# Patient Record
Sex: FEMALE
[De-identification: names, ages, dates, MRNs, and addresses within clinical notes are randomized per-mention and may not be internally consistent; named-entity substitution may affect disease eponyms.]

---

## 2021-09-17 ENCOUNTER — RX ONLY (RX ONLY)
Age: 51
End: 2021-09-17

## 2021-09-17 RX ORDER — FLUCONAZOLE 200 MG/1
TABLET ORAL
Qty: 7 | Refills: 2 | Status: ERX | COMMUNITY
Start: 2021-09-17

## 2021-09-30 ENCOUNTER — APPOINTMENT (OUTPATIENT)
Dept: URBAN - METROPOLITAN AREA CLINIC 271 | Age: 51
Setting detail: DERMATOLOGY
End: 2021-10-20

## 2021-09-30 DIAGNOSIS — Z41.9 ENCOUNTER FOR PROCEDURE FOR PURPOSES OTHER THAN REMEDYING HEALTH STATE, UNSPECIFIED: ICD-10-CM

## 2021-09-30 PROCEDURE — OTHER BOTOX: OTHER

## 2021-09-30 NOTE — PROCEDURE: BOTOX
Consent: Written consent obtained. Risks include but not limited to lid/brow ptosis, bruising, swelling, diplopia, temporary effect, incomplete chemical denervation. Patient verbalized understanding.\\nPatient and RN went over amount of units needed and ugalde, patient agreed with assessment and consented to proceed.

## 2021-11-18 ENCOUNTER — RX ONLY (RX ONLY)
Age: 51
End: 2021-11-18

## 2021-11-18 RX ORDER — NITROFURANTOIN MONOHYDRATE/MACROCRYSTALLINE 25; 75 MG/1; MG/1
CAPSULE ORAL
Qty: 10 | Refills: 1 | Status: ERX | COMMUNITY
Start: 2021-11-18

## 2021-11-18 RX ORDER — FLUCONAZOLE 200 MG/1
TABLET ORAL
Qty: 7 | Refills: 1 | Status: ERX | COMMUNITY
Start: 2021-11-18

## 2021-12-03 ENCOUNTER — RX ONLY (RX ONLY)
Age: 51
End: 2021-12-03

## 2021-12-03 RX ORDER — CEPHALEXIN 500 MG/1
CAPSULE ORAL
Qty: 21 | Refills: 3 | Status: ERX | COMMUNITY
Start: 2021-12-03

## 2021-12-16 ENCOUNTER — APPOINTMENT (OUTPATIENT)
Dept: URBAN - METROPOLITAN AREA CLINIC 271 | Age: 51
Setting detail: DERMATOLOGY
End: 2021-12-17

## 2021-12-16 DIAGNOSIS — Z41.9 ENCOUNTER FOR PROCEDURE FOR PURPOSES OTHER THAN REMEDYING HEALTH STATE, UNSPECIFIED: ICD-10-CM

## 2021-12-16 PROCEDURE — OTHER BOTOX: OTHER

## 2021-12-16 PROCEDURE — OTHER COSMETIC CONSULTATION: BOTOX: OTHER

## 2021-12-16 NOTE — HPI: COSMETIC (BOTOX)
Have You Had Botox Before?: has had botox
When Was Your Last Botox Treatment?: 2-3mo ago, requests light tough up today

## 2022-04-22 ENCOUNTER — RX ONLY (RX ONLY)
Age: 52
End: 2022-04-22

## 2022-04-22 RX ORDER — FLUCONAZOLE 200 MG/1
TABLET ORAL
Qty: 7 | Refills: 1 | Status: ERX

## 2022-04-22 RX ORDER — CEPHALEXIN 500 MG/1
CAPSULE ORAL
Qty: 21 | Refills: 3 | Status: ERX

## 2022-04-27 ENCOUNTER — RX ONLY (RX ONLY)
Age: 52
End: 2022-04-27

## 2022-04-27 RX ORDER — VALACYCLOVIR HYDROCHLORIDE 1 G/1
TABLET, FILM COATED ORAL
Qty: 21 | Refills: 1 | Status: ERX | COMMUNITY
Start: 2022-04-27

## 2022-06-28 ENCOUNTER — RX ONLY (RX ONLY)
Age: 52
End: 2022-06-28

## 2022-06-28 RX ORDER — AZITHROMYCIN DIHYDRATE 250 MG/1
TABLET, FILM COATED ORAL
Qty: 1 | Refills: 1 | Status: ERX | COMMUNITY
Start: 2022-06-28

## 2023-08-14 ENCOUNTER — RX ONLY (RX ONLY)
Age: 53
End: 2023-08-14

## 2023-08-14 RX ORDER — TIRBANIBULIN 10 MG/G
OINTMENT TOPICAL
Qty: 1 | Refills: 0 | Status: ERX | COMMUNITY
Start: 2023-08-14

## 2023-10-09 ENCOUNTER — APPOINTMENT (OUTPATIENT)
Dept: URBAN - METROPOLITAN AREA CLINIC 303 | Age: 53
Setting detail: DERMATOLOGY
End: 2023-10-09

## 2023-10-09 DIAGNOSIS — Z41.9 ENCOUNTER FOR PROCEDURE FOR PURPOSES OTHER THAN REMEDYING HEALTH STATE, UNSPECIFIED: ICD-10-CM

## 2023-10-09 PROCEDURE — OTHER BOTOX: OTHER

## 2023-10-09 NOTE — PROCEDURE: BOTOX
Consent: Written consent obtained. Risks include but not limited to lid/brow ptosis, bruising, swelling, diplopia, temporary effect, incomplete chemical denervation. \\n\\n* Patient denies pregnancy, breastfeeding, neuromuscular disease,  egg allergy, recent/upcoming eye surgery, recent/upcoming vaccine within 2 wks, upcoming dental appt within 2wks, or recent aminoglycoside antibiotic treatment.\\n\\n*Thoroughly cleansed sites with alcohol prior to injection.\\n\\n*

## 2023-10-09 NOTE — PROCEDURE: BOTOX
POST-CARE INSTRUCTIONS\\nLimit physical activity for 24 hrs.\\nAvoid pressure on the treated sites today - no tight hats, scratching, or massaging.\\n'Exercise' the treated muscles for the next few hours by making purposeful facial expressions every 20min.\\nRemain upright for 4 hours - no reclining, yoga, etc.\\nAvoid makeup application for at least 2 hours.\\nEffects are not immediate. Effects usually last 3-4 months but depends on metabolism, gender, muscle mass, amount of units injected, etc.\\nFollow-up or touch-up can be considered in 2 weeks if patient has any concerns (ie unevenness, continued localized muscle movement), otherwise recommended follow up in 3-4mo.

## 2023-10-23 ENCOUNTER — APPOINTMENT (OUTPATIENT)
Dept: URBAN - METROPOLITAN AREA CLINIC 303 | Age: 53
Setting detail: DERMATOLOGY
End: 2023-10-23

## 2023-10-23 DIAGNOSIS — Z41.9 ENCOUNTER FOR PROCEDURE FOR PURPOSES OTHER THAN REMEDYING HEALTH STATE, UNSPECIFIED: ICD-10-CM

## 2023-10-23 PROCEDURE — OTHER BOTOX: OTHER

## 2023-11-06 ENCOUNTER — RX ONLY (RX ONLY)
Age: 53
End: 2023-11-06

## 2023-11-06 RX ORDER — PHARMACY COMPOUNDING ACCESSORY
EACH MISCELLANEOUS
Qty: 15 | Refills: 5 | Status: ERX | COMMUNITY
Start: 2023-11-06

## 2023-12-15 ENCOUNTER — APPOINTMENT (OUTPATIENT)
Dept: URBAN - METROPOLITAN AREA CLINIC 304 | Age: 53
Setting detail: DERMATOLOGY
End: 2023-12-15

## 2023-12-15 DIAGNOSIS — L30.9 DERMATITIS, UNSPECIFIED: ICD-10-CM

## 2023-12-15 PROCEDURE — OTHER ORDER TESTS: OTHER

## 2023-12-15 NOTE — PROCEDURE: ORDER TESTS
Performing Laboratory: -[id number]
Expected Date Of Service: 12/15/2023
Billing Type: Third-Party Bill
Bill For Surgical Tray: no

## 2023-12-19 ENCOUNTER — RX ONLY (RX ONLY)
Age: 53
End: 2023-12-19

## 2023-12-19 RX ORDER — AZITHROMYCIN MONOHYDRATE 250 MG/1
TABLET, FILM COATED ORAL
Qty: 1 | Refills: 0 | Status: ERX | COMMUNITY
Start: 2023-12-19

## 2024-02-14 ENCOUNTER — RX ONLY (RX ONLY)
Age: 54
End: 2024-02-14

## 2024-02-14 ENCOUNTER — APPOINTMENT (OUTPATIENT)
Dept: URBAN - METROPOLITAN AREA CLINIC 304 | Age: 54
Setting detail: DERMATOLOGY
End: 2024-02-14

## 2024-02-14 DIAGNOSIS — Z41.9 ENCOUNTER FOR PROCEDURE FOR PURPOSES OTHER THAN REMEDYING HEALTH STATE, UNSPECIFIED: ICD-10-CM

## 2024-02-14 PROCEDURE — OTHER INVENTORY: OTHER

## 2024-02-14 PROCEDURE — OTHER MIPS QUALITY: OTHER

## 2024-02-14 RX ORDER — BIMATOPROST 0.3 MG/ML
SOLUTION/ DROPS OPHTHALMIC
Qty: 5 | Refills: 5 | Status: ERX | COMMUNITY
Start: 2024-02-14

## 2024-02-14 ASSESSMENT — LOCATION DETAILED DESCRIPTION DERM
LOCATION DETAILED: GLABELLA
LOCATION DETAILED: LEFT SUPERIOR MEDIAL FOREHEAD
LOCATION DETAILED: RIGHT SUPERIOR LATERAL MALAR CHEEK
LOCATION DETAILED: RIGHT FOREHEAD
LOCATION DETAILED: LEFT INFERIOR LATERAL FOREHEAD
LOCATION DETAILED: RIGHT CENTRAL MALAR CHEEK
LOCATION DETAILED: LEFT SUPERIOR LATERAL MALAR CHEEK
LOCATION DETAILED: RIGHT SUPERIOR FOREHEAD
LOCATION DETAILED: LEFT MEDIAL FOREHEAD
LOCATION DETAILED: RIGHT INFERIOR LATERAL FOREHEAD
LOCATION DETAILED: RIGHT INFERIOR TEMPLE
LOCATION DETAILED: LEFT INFERIOR TEMPLE
LOCATION DETAILED: LEFT SUPERIOR CENTRAL MALAR CHEEK
LOCATION DETAILED: RIGHT SUPERIOR MEDIAL FOREHEAD
LOCATION DETAILED: RIGHT INFERIOR MEDIAL FOREHEAD
LOCATION DETAILED: LEFT FOREHEAD
LOCATION DETAILED: LEFT INFERIOR FOREHEAD

## 2024-02-14 ASSESSMENT — LOCATION ZONE DERM: LOCATION ZONE: FACE

## 2024-02-14 ASSESSMENT — LOCATION SIMPLE DESCRIPTION DERM
LOCATION SIMPLE: GLABELLA
LOCATION SIMPLE: LEFT TEMPLE
LOCATION SIMPLE: LEFT FOREHEAD
LOCATION SIMPLE: RIGHT TEMPLE
LOCATION SIMPLE: RIGHT CHEEK
LOCATION SIMPLE: RIGHT FOREHEAD
LOCATION SIMPLE: LEFT CHEEK

## 2024-04-10 ENCOUNTER — RX ONLY (RX ONLY)
Age: 54
End: 2024-04-10

## 2024-04-10 RX ORDER — PHARMACY COMPOUNDING ACCESSORY
EACH MISCELLANEOUS
Qty: 30 | Refills: 3 | Status: ERX

## 2024-10-01 ENCOUNTER — APPOINTMENT (OUTPATIENT)
Dept: URBAN - METROPOLITAN AREA CLINIC 304 | Age: 54
Setting detail: DERMATOLOGY
End: 2024-10-01

## 2024-10-01 DIAGNOSIS — Z41.9 ENCOUNTER FOR PROCEDURE FOR PURPOSES OTHER THAN REMEDYING HEALTH STATE, UNSPECIFIED: ICD-10-CM

## 2024-10-01 PROCEDURE — OTHER INVENTORY: OTHER

## 2024-10-09 ENCOUNTER — RX ONLY (RX ONLY)
Age: 54
End: 2024-10-09

## 2024-10-09 RX ORDER — PHARMACY COMPOUNDING ACCESSORY
EACH MISCELLANEOUS
Qty: 30 | Refills: 7 | Status: ERX

## 2024-10-14 ENCOUNTER — RX ONLY (RX ONLY)
Age: 54
End: 2024-10-14

## 2024-10-14 RX ORDER — OXYMETAZOLINE HYDROCHLORIDE 1 G/100G
CREAM TOPICAL
Qty: 30 | Refills: 4 | Status: ERX | COMMUNITY
Start: 2024-10-14

## 2024-10-28 ENCOUNTER — APPOINTMENT (OUTPATIENT)
Dept: URBAN - METROPOLITAN AREA CLINIC 304 | Age: 54
Setting detail: DERMATOLOGY
End: 2024-10-28

## 2024-10-28 DIAGNOSIS — Z41.9 ENCOUNTER FOR PROCEDURE FOR PURPOSES OTHER THAN REMEDYING HEALTH STATE, UNSPECIFIED: ICD-10-CM

## 2024-10-28 PROCEDURE — OTHER BOTOX: OTHER

## 2024-10-28 PROCEDURE — OTHER ADDITIONAL NOTES: OTHER

## 2024-10-28 ASSESSMENT — LOCATION DETAILED DESCRIPTION DERM
LOCATION DETAILED: LEFT INFERIOR LATERAL FOREHEAD
LOCATION DETAILED: RIGHT SUPERIOR TEMPLE

## 2024-10-28 ASSESSMENT — LOCATION SIMPLE DESCRIPTION DERM
LOCATION SIMPLE: RIGHT TEMPLE
LOCATION SIMPLE: LEFT FOREHEAD

## 2024-10-28 ASSESSMENT — LOCATION ZONE DERM: LOCATION ZONE: FACE

## 2024-10-28 NOTE — PROCEDURE: BOTOX
Show Right And Left Periorbital Units: No
Lateral Platysmal Bands Units: 0
Consent: Written consent obtained. Risks include but not limited to lid/brow ptosis, bruising, swelling, diplopia, temporary effect, incomplete chemical denervation.
Show Inventory Tab: Hide
Incrementing Botox Units: By 0.5 Units
Show Lateral Platysmal Band Units: Yes
Post-Care Instructions: Patient instructed to not lie down for 4 hours and limit physical activity for 24 hours.
Dilution (U/0.1 Cc): 4
Forehead Units: 1.5
Detail Level: Detailed

## 2024-11-12 ENCOUNTER — RX ONLY (RX ONLY)
Age: 54
End: 2024-11-12

## 2024-11-12 RX ORDER — TIRBANIBULIN 10 MG/G
OINTMENT TOPICAL
Qty: 5 | Refills: 2 | Status: ERX

## 2024-12-30 ENCOUNTER — RX ONLY (RX ONLY)
Age: 54
End: 2024-12-30

## 2024-12-30 RX ORDER — AZITHROMYCIN MONOHYDRATE 250 MG/1
TABLET, FILM COATED ORAL
Qty: 1 | Refills: 0 | Status: ERX

## 2025-02-18 ENCOUNTER — RX ONLY (RX ONLY)
Age: 55
End: 2025-02-18

## 2025-02-18 RX ORDER — FLUCONAZOLE 200 MG/1
TABLET ORAL
Qty: 7 | Refills: 1 | Status: ERX | COMMUNITY
Start: 2025-02-18

## 2025-04-04 ENCOUNTER — APPOINTMENT (OUTPATIENT)
Dept: URBAN - METROPOLITAN AREA SURGERY 12 | Age: 55
Setting detail: DERMATOLOGY
End: 2025-04-04

## 2025-04-04 DIAGNOSIS — Z41.9 ENCOUNTER FOR PROCEDURE FOR PURPOSES OTHER THAN REMEDYING HEALTH STATE, UNSPECIFIED: ICD-10-CM

## 2025-04-04 PROCEDURE — OTHER ADDITIONAL NOTES: OTHER

## 2025-04-04 PROCEDURE — OTHER BOTOX: OTHER

## 2025-04-04 ASSESSMENT — LOCATION SIMPLE DESCRIPTION DERM
LOCATION SIMPLE: LEFT FOREHEAD
LOCATION SIMPLE: RIGHT TEMPLE

## 2025-04-04 ASSESSMENT — LOCATION DETAILED DESCRIPTION DERM
LOCATION DETAILED: RIGHT SUPERIOR TEMPLE
LOCATION DETAILED: LEFT INFERIOR LATERAL FOREHEAD

## 2025-04-04 ASSESSMENT — LOCATION ZONE DERM: LOCATION ZONE: FACE

## 2025-04-04 NOTE — PROCEDURE: BOTOX
Show Right And Left Periorbital Units: No
Lateral Platysmal Bands Units: 0
Consent: Risks include but not limited to lid/brow ptosis, bruising, swelling, diplopia, temporary effect, incomplete chemical denervation.
Show Inventory Tab: Show
Incrementing Botox Units: By 0.5 Units
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
Show Lateral Platysmal Band Units: Yes
Post-Care Instructions: Patient instructed to not lie down for 4 hours and limit physical activity for 24 hours.
Detail Level: Detailed
Consent: Written consent obtained. Risks include but not limited to lid/brow ptosis, bruising, swelling, diplopia, temporary effect, incomplete chemical denervation. \\n*Thoroughly cleansed sites with alcohol prior to injection.\\n\\n*
POST-CARE INSTRUCTIONS\\nLimit physical activity for 24 hrs.\\nAvoid pressure on the treated sites today - no tight hats, scratching, or massaging.\\n'Exercise' the treated muscles for the next few hours by making purposeful facial expressions every 20min.\\nRemain upright for 4 hours - no reclining, yoga, etc.\\nAvoid makeup application for at least 2 hours.\\nEffects are not immediate. Effects usually last 3-4 months but depends on metabolism, gender, muscle mass, amount of units injected, etc.\\nFollow-up or touch-up can be considered in 2 weeks if patient has any concerns (ie unevenness, continued localized muscle movement), otherwise recommended follow up in 3-4mo.
Map Statement: Please see attached map for locations and injection amounts.
Show Inventory Tab: Hide
Patient/Family

## 2025-04-04 NOTE — PROCEDURE: ADDITIONAL NOTES
Detail Level: Simple
Render Risk Assessment In Note?: no
Additional Notes: Lot# [id number] Exp 11/2026\\n74 u sampled to employee

## 2025-07-28 ENCOUNTER — RX ONLY (RX ONLY)
Age: 55
End: 2025-07-28

## 2025-07-28 RX ORDER — FLUCONAZOLE 200 MG/1
TABLET ORAL
Qty: 7 | Refills: 6 | Status: ERX

## 2025-07-29 ENCOUNTER — RX ONLY (RX ONLY)
Age: 55
End: 2025-07-29

## 2025-07-29 RX ORDER — FLUCONAZOLE 200 MG/1
TABLET ORAL
Qty: 7 | Refills: 6 | Status: ERX